# Patient Record
Sex: MALE | URBAN - METROPOLITAN AREA
[De-identification: names, ages, dates, MRNs, and addresses within clinical notes are randomized per-mention and may not be internally consistent; named-entity substitution may affect disease eponyms.]

---

## 2017-01-31 ENCOUNTER — APPOINTMENT (RX ONLY)
Dept: URBAN - METROPOLITAN AREA CLINIC 116 | Facility: CLINIC | Age: 82
Setting detail: DERMATOLOGY
End: 2017-01-31

## 2017-01-31 DIAGNOSIS — I87.2 VENOUS INSUFFICIENCY (CHRONIC) (PERIPHERAL): ICD-10-CM

## 2017-01-31 DIAGNOSIS — D18.0 HEMANGIOMA: ICD-10-CM

## 2017-01-31 DIAGNOSIS — L82.1 OTHER SEBORRHEIC KERATOSIS: ICD-10-CM

## 2017-01-31 DIAGNOSIS — L21.8 OTHER SEBORRHEIC DERMATITIS: ICD-10-CM

## 2017-01-31 DIAGNOSIS — D22 MELANOCYTIC NEVI: ICD-10-CM

## 2017-01-31 PROBLEM — I83.11 VARICOSE VEINS OF RIGHT LOWER EXTREMITY WITH INFLAMMATION: Status: ACTIVE | Noted: 2017-01-31

## 2017-01-31 PROBLEM — I83.12 VARICOSE VEINS OF LEFT LOWER EXTREMITY WITH INFLAMMATION: Status: ACTIVE | Noted: 2017-01-31

## 2017-01-31 PROBLEM — Z92.3 PERSONAL HISTORY OF IRRADIATION: Status: ACTIVE | Noted: 2017-01-31

## 2017-01-31 PROBLEM — D18.01 HEMANGIOMA OF SKIN AND SUBCUTANEOUS TISSUE: Status: ACTIVE | Noted: 2017-01-31

## 2017-01-31 PROBLEM — D22.4 MELANOCYTIC NEVI OF SCALP AND NECK: Status: ACTIVE | Noted: 2017-01-31

## 2017-01-31 PROBLEM — Z85.46 PERSONAL HISTORY OF MALIGNANT NEOPLASM OF PROSTATE: Status: ACTIVE | Noted: 2017-01-31

## 2017-01-31 PROCEDURE — ? COUNSELING

## 2017-01-31 PROCEDURE — 99214 OFFICE O/P EST MOD 30 MIN: CPT

## 2017-01-31 ASSESSMENT — LOCATION DETAILED DESCRIPTION DERM
LOCATION DETAILED: RIGHT MEDIAL INFERIOR CHEST
LOCATION DETAILED: RIGHT CLAVICULAR NECK
LOCATION DETAILED: EPIGASTRIC SKIN
LOCATION DETAILED: LEFT MEDIAL UPPER BACK
LOCATION DETAILED: LEFT MID-UPPER BACK
LOCATION DETAILED: PERIUMBILICAL SKIN
LOCATION DETAILED: LEFT PROXIMAL PRETIBIAL REGION
LOCATION DETAILED: NASAL DORSUM
LOCATION DETAILED: RIGHT DISTAL PRETIBIAL REGION
LOCATION DETAILED: LEFT SUPERIOR MEDIAL MIDBACK

## 2017-01-31 ASSESSMENT — SEVERITY ASSESSMENT
HOW SEVERE IS THIS PATIENT'S CONDITION?: MILD
SEVERITY: MILD

## 2017-01-31 ASSESSMENT — LOCATION SIMPLE DESCRIPTION DERM
LOCATION SIMPLE: LEFT PRETIBIAL REGION
LOCATION SIMPLE: RIGHT ANTERIOR NECK
LOCATION SIMPLE: LEFT UPPER BACK
LOCATION SIMPLE: RIGHT PRETIBIAL REGION
LOCATION SIMPLE: ABDOMEN
LOCATION SIMPLE: LEFT LOWER BACK
LOCATION SIMPLE: CHEST
LOCATION SIMPLE: NOSE

## 2017-01-31 ASSESSMENT — LOCATION ZONE DERM
LOCATION ZONE: TRUNK
LOCATION ZONE: NOSE
LOCATION ZONE: NECK
LOCATION ZONE: LEG

## 2018-01-05 ENCOUNTER — NEW PATIENT (OUTPATIENT)
Dept: URBAN - METROPOLITAN AREA CLINIC 26 | Facility: CLINIC | Age: 83
End: 2018-01-05

## 2018-01-05 VITALS
HEIGHT: 68 IN | BODY MASS INDEX: 26.52 KG/M2 | HEART RATE: 78 BPM | DIASTOLIC BLOOD PRESSURE: 71 MMHG | SYSTOLIC BLOOD PRESSURE: 125 MMHG | WEIGHT: 175 LBS

## 2018-01-05 DIAGNOSIS — H35.3132: ICD-10-CM

## 2018-01-05 DIAGNOSIS — H43.813: ICD-10-CM

## 2018-01-05 DIAGNOSIS — H26.491: ICD-10-CM

## 2018-01-05 PROCEDURE — 92134 CPTRZ OPH DX IMG PST SGM RTA: CPT

## 2018-01-05 PROCEDURE — 92250 FUNDUS PHOTOGRAPHY W/I&R: CPT

## 2018-01-05 PROCEDURE — 99204 OFFICE O/P NEW MOD 45 MIN: CPT

## 2018-01-05 ASSESSMENT — VISUAL ACUITY
OD_CC: 20/40-2
OS_CC: 20/25+2
OD_PH: 20/30-2

## 2018-01-05 ASSESSMENT — TONOMETRY
OD_IOP_MMHG: 15
OS_IOP_MMHG: 19

## 2018-02-20 ENCOUNTER — APPOINTMENT (RX ONLY)
Dept: URBAN - METROPOLITAN AREA CLINIC 116 | Facility: CLINIC | Age: 83
Setting detail: DERMATOLOGY
End: 2018-02-20

## 2018-02-20 DIAGNOSIS — D485 NEOPLASM OF UNCERTAIN BEHAVIOR OF SKIN: ICD-10-CM

## 2018-02-20 DIAGNOSIS — L57.0 ACTINIC KERATOSIS: ICD-10-CM

## 2018-02-20 DIAGNOSIS — D69.2 OTHER NONTHROMBOCYTOPENIC PURPURA: ICD-10-CM

## 2018-02-20 DIAGNOSIS — L82.1 OTHER SEBORRHEIC KERATOSIS: ICD-10-CM

## 2018-02-20 DIAGNOSIS — Z71.89 OTHER SPECIFIED COUNSELING: ICD-10-CM

## 2018-02-20 DIAGNOSIS — D18.0 HEMANGIOMA: ICD-10-CM

## 2018-02-20 DIAGNOSIS — D22 MELANOCYTIC NEVI: ICD-10-CM

## 2018-02-20 PROBLEM — D22.4 MELANOCYTIC NEVI OF SCALP AND NECK: Status: ACTIVE | Noted: 2018-02-20

## 2018-02-20 PROBLEM — D18.01 HEMANGIOMA OF SKIN AND SUBCUTANEOUS TISSUE: Status: ACTIVE | Noted: 2018-02-20

## 2018-02-20 PROBLEM — D48.5 NEOPLASM OF UNCERTAIN BEHAVIOR OF SKIN: Status: ACTIVE | Noted: 2018-02-20

## 2018-02-20 PROCEDURE — 99214 OFFICE O/P EST MOD 30 MIN: CPT | Mod: 25

## 2018-02-20 PROCEDURE — ? LIQUID NITROGEN

## 2018-02-20 PROCEDURE — ? COUNSELING

## 2018-02-20 PROCEDURE — 17000 DESTRUCT PREMALG LESION: CPT

## 2018-02-20 PROCEDURE — ? BIOPSY BY SHAVE METHOD

## 2018-02-20 PROCEDURE — 69100 BIOPSY OF EXTERNAL EAR: CPT | Mod: 59

## 2018-02-20 ASSESSMENT — LOCATION ZONE DERM
LOCATION ZONE: TRUNK
LOCATION ZONE: NOSE
LOCATION ZONE: EAR
LOCATION ZONE: ARM
LOCATION ZONE: NECK

## 2018-02-20 ASSESSMENT — LOCATION SIMPLE DESCRIPTION DERM
LOCATION SIMPLE: LEFT UPPER BACK
LOCATION SIMPLE: LEFT LOWER BACK
LOCATION SIMPLE: RIGHT EAR
LOCATION SIMPLE: ABDOMEN
LOCATION SIMPLE: CHEST
LOCATION SIMPLE: NOSE
LOCATION SIMPLE: RIGHT ANTERIOR NECK
LOCATION SIMPLE: RIGHT FOREARM

## 2018-02-20 ASSESSMENT — LOCATION DETAILED DESCRIPTION DERM
LOCATION DETAILED: RIGHT CLAVICULAR NECK
LOCATION DETAILED: PERIUMBILICAL SKIN
LOCATION DETAILED: RIGHT ANTITRAGUS
LOCATION DETAILED: RIGHT DISTAL DORSAL FOREARM
LOCATION DETAILED: NASAL DORSUM
LOCATION DETAILED: RIGHT MEDIAL SUPERIOR CHEST
LOCATION DETAILED: LEFT MEDIAL UPPER BACK
LOCATION DETAILED: LEFT MID-UPPER BACK
LOCATION DETAILED: LEFT SUPERIOR MEDIAL MIDBACK

## 2018-02-20 NOTE — PROCEDURE: BIOPSY BY SHAVE METHOD
Bill [id number] For Specimen Handling/Conveyance To Laboratory?: no
Detail Level: Detailed
Hemostasis: Aluminum Chloride
Billing Type: [employer]
Electrodesiccation And Curettage Text: The wound bed was treated with electrodesiccation and curettage after the biopsy was performed.
Biopsy Type: H and E
Silver Nitrate Text: The wound bed was treated with silver nitrate after the biopsy was performed.
Lab Facility: [facility]
Biopsy Method: Personna blade
Wound Care: Polysporin ointment
Anesthesia Volume In Cc (Will Not Render If 0): 0.5
Notification Instructions: Patient will be notified of biopsy results. However, patient instructed to call the office if not contacted within 2 weeks.
Electrodesiccation Text: The wound bed was treated with electrodesiccation after the biopsy was performed.
Cryotherapy Text: The wound bed was treated with cryotherapy after the biopsy was performed.
Additional Anesthesia Volume In Cc (Will Not Render If 0): 0
Post-Care Instructions: I reviewed with the patient in detail post-care instructions. Patient is to keep the biopsy site dry overnight, and then apply bacitracin twice daily until healed. Patient may apply hydrogen peroxide soaks to remove any crusting.
Lab: [facility]
Render Post-Care Instructions In Note?: yes
Type Of Destruction Used: Curettage
Anesthesia Type: 1% lidocaine with epinephrine and a 1:10 solution of 8.4% sodium bicarbonate
Consent: Written consent was obtained and risks were reviewed including but not limited to scarring, infection, bleeding, scabbing, incomplete removal, nerve damage and allergy to anesthesia.
Dressing: pressure dressing
Size Of Lesion In Cm: 0.8

## 2018-02-20 NOTE — PROCEDURE: LIQUID NITROGEN
Render Post-Care Instructions In Note?: no
Post-Care Instructions: I reviewed with the patient in detail post-care instructions. Patient is to wear sunprotection, and avoid picking at any of the treated lesions. Pt may apply Vaseline to crusted or scabbing areas.
Number Of Freeze-Thaw Cycles: 2 freeze-thaw cycles
Consent: The patient's consent was obtained including but not limited to risks of crusting, scabbing, blistering, scarring, darker or lighter pigmentary change, recurrence, incomplete removal and infection.
Duration Of Freeze Thaw-Cycle (Seconds): 3
Detail Level: Detailed

## 2018-03-02 ENCOUNTER — UNSCHEDULED FOLLOW UP (OUTPATIENT)
Dept: URBAN - METROPOLITAN AREA CLINIC 26 | Facility: CLINIC | Age: 83
End: 2018-03-02

## 2018-03-02 DIAGNOSIS — H35.3132: ICD-10-CM

## 2018-03-02 DIAGNOSIS — H43.813: ICD-10-CM

## 2018-03-02 PROCEDURE — 92014 COMPRE OPH EXAM EST PT 1/>: CPT

## 2018-03-02 PROCEDURE — 92250 FUNDUS PHOTOGRAPHY W/I&R: CPT

## 2018-03-02 ASSESSMENT — TONOMETRY
OD_IOP_MMHG: 11
OS_IOP_MMHG: 15

## 2018-03-02 ASSESSMENT — VISUAL ACUITY
OD_CC: 20/30-2
OS_CC: 20/25+2

## 2018-03-16 ENCOUNTER — FOLLOW UP (OUTPATIENT)
Dept: URBAN - METROPOLITAN AREA CLINIC 26 | Facility: CLINIC | Age: 83
End: 2018-03-16

## 2018-03-16 DIAGNOSIS — H35.3112: ICD-10-CM

## 2018-03-16 DIAGNOSIS — H26.491: ICD-10-CM

## 2018-03-16 DIAGNOSIS — H43.813: ICD-10-CM

## 2018-03-16 DIAGNOSIS — H35.3211: ICD-10-CM

## 2018-03-16 PROCEDURE — 67028 INJECTION EYE DRUG: CPT

## 2018-03-16 PROCEDURE — 92012 INTRM OPH EXAM EST PATIENT: CPT

## 2018-03-16 PROCEDURE — 92134 CPTRZ OPH DX IMG PST SGM RTA: CPT

## 2018-03-16 ASSESSMENT — VISUAL ACUITY
OD_CC: 20/25
OS_CC: 20/20

## 2018-03-16 ASSESSMENT — TONOMETRY
OS_IOP_MMHG: 15
OD_IOP_MMHG: 13

## 2018-03-20 ENCOUNTER — APPOINTMENT (RX ONLY)
Dept: URBAN - METROPOLITAN AREA CLINIC 116 | Facility: CLINIC | Age: 83
Setting detail: DERMATOLOGY
End: 2018-03-20

## 2018-03-20 DIAGNOSIS — L57.0 ACTINIC KERATOSIS: ICD-10-CM

## 2018-03-20 DIAGNOSIS — L21.8 OTHER SEBORRHEIC DERMATITIS: ICD-10-CM

## 2018-03-20 PROCEDURE — ? LIQUID NITROGEN

## 2018-03-20 PROCEDURE — 99214 OFFICE O/P EST MOD 30 MIN: CPT | Mod: 25

## 2018-03-20 PROCEDURE — ? ADDITIONAL NOTES

## 2018-03-20 PROCEDURE — 17000 DESTRUCT PREMALG LESION: CPT

## 2018-03-20 PROCEDURE — ? COUNSELING

## 2018-03-20 ASSESSMENT — LOCATION DETAILED DESCRIPTION DERM
LOCATION DETAILED: LEFT CAVUM CONCHA
LOCATION DETAILED: RIGHT ANTITRAGUS

## 2018-03-20 ASSESSMENT — LOCATION SIMPLE DESCRIPTION DERM
LOCATION SIMPLE: LEFT EAR
LOCATION SIMPLE: RIGHT EAR

## 2018-03-20 ASSESSMENT — LOCATION ZONE DERM: LOCATION ZONE: EAR

## 2018-03-20 NOTE — PROCEDURE: LIQUID NITROGEN
Render Post-Care Instructions In Note?: no
Detail Level: Detailed
Post-Care Instructions: I reviewed with the patient in detail post-care instructions. Patient is to wear sunprotection, and avoid picking at any of the treated lesions. Pt may apply Vaseline to crusted or scabbing areas.
Consent: The patient's consent was obtained including but not limited to risks of crusting, scabbing, blistering, scarring, darker or lighter pigmentary change, recurrence, incomplete removal and infection.
Number Of Freeze-Thaw Cycles: 2 freeze-thaw cycles
Duration Of Freeze Thaw-Cycle (Seconds): 3

## 2018-04-20 ENCOUNTER — FOLLOW UP (OUTPATIENT)
Dept: URBAN - METROPOLITAN AREA CLINIC 26 | Facility: CLINIC | Age: 83
End: 2018-04-20

## 2018-04-20 DIAGNOSIS — H43.813: ICD-10-CM

## 2018-04-20 DIAGNOSIS — H35.3211: ICD-10-CM

## 2018-04-20 DIAGNOSIS — H04.123: ICD-10-CM

## 2018-04-20 DIAGNOSIS — H02.836: ICD-10-CM

## 2018-04-20 DIAGNOSIS — H26.491: ICD-10-CM

## 2018-04-20 DIAGNOSIS — H02.833: ICD-10-CM

## 2018-04-20 DIAGNOSIS — H35.3112: ICD-10-CM

## 2018-04-20 PROCEDURE — 67028 INJECTION EYE DRUG: CPT

## 2018-04-20 PROCEDURE — 92134 CPTRZ OPH DX IMG PST SGM RTA: CPT

## 2018-04-20 PROCEDURE — 92250 FUNDUS PHOTOGRAPHY W/I&R: CPT

## 2018-04-20 PROCEDURE — 92012 INTRM OPH EXAM EST PATIENT: CPT

## 2018-04-20 ASSESSMENT — VISUAL ACUITY
OS_CC: 20/25-1
OD_CC: 20/30-2

## 2018-04-20 ASSESSMENT — TONOMETRY
OS_IOP_MMHG: 15
OD_IOP_MMHG: 13

## 2018-10-26 ENCOUNTER — FOLLOW UP AND POST INJECTION EVALUATION (OUTPATIENT)
Dept: URBAN - METROPOLITAN AREA CLINIC 26 | Facility: CLINIC | Age: 83
End: 2018-10-26

## 2018-10-26 DIAGNOSIS — H35.3112: ICD-10-CM

## 2018-10-26 DIAGNOSIS — H43.813: ICD-10-CM

## 2018-10-26 DIAGNOSIS — H35.3211: ICD-10-CM

## 2018-10-26 PROCEDURE — 92134 CPTRZ OPH DX IMG PST SGM RTA: CPT

## 2018-10-26 PROCEDURE — 67028 INJECTION EYE DRUG: CPT

## 2018-10-26 PROCEDURE — 92250 FUNDUS PHOTOGRAPHY W/I&R: CPT

## 2018-10-26 PROCEDURE — 92014 COMPRE OPH EXAM EST PT 1/>: CPT

## 2018-10-26 ASSESSMENT — TONOMETRY
OD_IOP_MMHG: 14
OS_IOP_MMHG: 15

## 2018-10-26 ASSESSMENT — VISUAL ACUITY
OD_CC: 20/50+2
OS_CC: 20/20

## 2018-12-07 ENCOUNTER — FOLLOW UP AND POST INJECTION EVALUATION (OUTPATIENT)
Dept: URBAN - METROPOLITAN AREA CLINIC 26 | Facility: CLINIC | Age: 83
End: 2018-12-07

## 2018-12-07 DIAGNOSIS — H43.813: ICD-10-CM

## 2018-12-07 DIAGNOSIS — H35.3211: ICD-10-CM

## 2018-12-07 DIAGNOSIS — H35.3112: ICD-10-CM

## 2018-12-07 PROCEDURE — 92250 FUNDUS PHOTOGRAPHY W/I&R: CPT

## 2018-12-07 PROCEDURE — 92012 INTRM OPH EXAM EST PATIENT: CPT

## 2018-12-07 PROCEDURE — 67028 INJECTION EYE DRUG: CPT

## 2018-12-07 PROCEDURE — 92134 CPTRZ OPH DX IMG PST SGM RTA: CPT

## 2018-12-07 ASSESSMENT — VISUAL ACUITY
OS_CC: 20/20
OD_CC: 20/50+2
OD_PH: 20/40

## 2018-12-07 ASSESSMENT — TONOMETRY
OD_IOP_MMHG: 11
OS_IOP_MMHG: 11

## 2019-01-10 ENCOUNTER — APPOINTMENT (RX ONLY)
Dept: URBAN - METROPOLITAN AREA CLINIC 116 | Facility: CLINIC | Age: 84
Setting detail: DERMATOLOGY
End: 2019-01-10

## 2019-01-10 DIAGNOSIS — D22 MELANOCYTIC NEVI: ICD-10-CM

## 2019-01-10 DIAGNOSIS — L57.0 ACTINIC KERATOSIS: ICD-10-CM

## 2019-01-10 DIAGNOSIS — L82.1 OTHER SEBORRHEIC KERATOSIS: ICD-10-CM

## 2019-01-10 DIAGNOSIS — L82.0 INFLAMED SEBORRHEIC KERATOSIS: ICD-10-CM

## 2019-01-10 DIAGNOSIS — Z71.89 OTHER SPECIFIED COUNSELING: ICD-10-CM

## 2019-01-10 DIAGNOSIS — D69.2 OTHER NONTHROMBOCYTOPENIC PURPURA: ICD-10-CM

## 2019-01-10 DIAGNOSIS — D18.0 HEMANGIOMA: ICD-10-CM

## 2019-01-10 PROBLEM — D22.4 MELANOCYTIC NEVI OF SCALP AND NECK: Status: ACTIVE | Noted: 2019-01-10

## 2019-01-10 PROBLEM — D18.01 HEMANGIOMA OF SKIN AND SUBCUTANEOUS TISSUE: Status: ACTIVE | Noted: 2019-01-10

## 2019-01-10 PROCEDURE — 99214 OFFICE O/P EST MOD 30 MIN: CPT | Mod: 25

## 2019-01-10 PROCEDURE — 17000 DESTRUCT PREMALG LESION: CPT | Mod: 59

## 2019-01-10 PROCEDURE — ? LIQUID NITROGEN

## 2019-01-10 PROCEDURE — ? COUNSELING

## 2019-01-10 PROCEDURE — 17110 DESTRUCTION B9 LES UP TO 14: CPT

## 2019-01-10 ASSESSMENT — LOCATION DETAILED DESCRIPTION DERM
LOCATION DETAILED: LEFT MEDIAL UPPER BACK
LOCATION DETAILED: LEFT SUPERIOR MEDIAL MIDBACK
LOCATION DETAILED: RIGHT DISTAL DORSAL FOREARM
LOCATION DETAILED: RIGHT CLAVICULAR SKIN
LOCATION DETAILED: PERIUMBILICAL SKIN
LOCATION DETAILED: LEFT MID-UPPER BACK
LOCATION DETAILED: RIGHT ANTERIOR SHOULDER
LOCATION DETAILED: RIGHT CLAVICULAR NECK
LOCATION DETAILED: NASAL DORSUM

## 2019-01-10 ASSESSMENT — LOCATION ZONE DERM
LOCATION ZONE: NECK
LOCATION ZONE: TRUNK
LOCATION ZONE: NOSE
LOCATION ZONE: ARM

## 2019-01-10 ASSESSMENT — LOCATION SIMPLE DESCRIPTION DERM
LOCATION SIMPLE: LEFT LOWER BACK
LOCATION SIMPLE: RIGHT ANTERIOR NECK
LOCATION SIMPLE: RIGHT CLAVICULAR SKIN
LOCATION SIMPLE: RIGHT SHOULDER
LOCATION SIMPLE: RIGHT FOREARM
LOCATION SIMPLE: LEFT UPPER BACK
LOCATION SIMPLE: NOSE
LOCATION SIMPLE: ABDOMEN

## 2019-01-10 NOTE — PROCEDURE: LIQUID NITROGEN
Number Of Freeze-Thaw Cycles: 2 freeze-thaw cycles
Render Post-Care Instructions In Note?: no
Consent: The patient's consent was obtained including but not limited to risks of crusting, scabbing, blistering, scarring, darker or lighter pigmentary change, recurrence, incomplete removal and infection.
Detail Level: Detailed
Post-Care Instructions: I reviewed with the patient in detail post-care instructions. Patient is to wear sunprotection, and avoid picking at any of the treated lesions. Pt may apply Vaseline to crusted or scabbing areas.
Duration Of Freeze Thaw-Cycle (Seconds): 3
Medical Necessity Information: It is in your best interest to select a reason for this procedure from the list below. All of these items fulfill various CMS LCD requirements except the new and changing color options.
Render Post-Care Instructions In Note?: yes
Medical Necessity Clause: This procedure was medically necessary because the lesions that were treated were:

## 2019-01-25 ENCOUNTER — CLINICAL PROCEDURE AND DIAGNOSTIC TESTING ONLY (OUTPATIENT)
Dept: URBAN - METROPOLITAN AREA CLINIC 26 | Facility: CLINIC | Age: 84
End: 2019-01-25

## 2019-01-25 DIAGNOSIS — H35.3211: ICD-10-CM

## 2019-01-25 PROCEDURE — 67028 INJECTION EYE DRUG: CPT

## 2019-01-25 PROCEDURE — 92134 CPTRZ OPH DX IMG PST SGM RTA: CPT

## 2019-01-25 ASSESSMENT — TONOMETRY
OS_IOP_MMHG: 16
OD_IOP_MMHG: 17

## 2019-01-25 ASSESSMENT — VISUAL ACUITY
OS_CC: 20/20-2
OD_CC: 20/30-2

## 2019-03-14 ENCOUNTER — CLINICAL PROCEDURE AND DIAGNOSTIC TESTING ONLY (OUTPATIENT)
Dept: URBAN - METROPOLITAN AREA CLINIC 26 | Facility: CLINIC | Age: 84
End: 2019-03-14

## 2019-03-14 DIAGNOSIS — H35.3211: ICD-10-CM

## 2019-03-14 PROCEDURE — 67028 INJECTION EYE DRUG: CPT

## 2019-03-14 PROCEDURE — 92134 CPTRZ OPH DX IMG PST SGM RTA: CPT

## 2019-03-14 ASSESSMENT — TONOMETRY
OD_IOP_MMHG: 14
OS_IOP_MMHG: 13

## 2019-03-14 ASSESSMENT — VISUAL ACUITY
OS_CC: 20/25+1
OD_CC: 20/30-2

## 2019-10-29 ENCOUNTER — APPOINTMENT (RX ONLY)
Dept: URBAN - METROPOLITAN AREA CLINIC 116 | Facility: CLINIC | Age: 84
Setting detail: DERMATOLOGY
End: 2019-10-29

## 2019-10-29 DIAGNOSIS — L57.0 ACTINIC KERATOSIS: ICD-10-CM

## 2019-10-29 DIAGNOSIS — D69.2 OTHER NONTHROMBOCYTOPENIC PURPURA: ICD-10-CM

## 2019-10-29 DIAGNOSIS — D22 MELANOCYTIC NEVI: ICD-10-CM

## 2019-10-29 DIAGNOSIS — L30.4 ERYTHEMA INTERTRIGO: ICD-10-CM

## 2019-10-29 DIAGNOSIS — Z71.89 OTHER SPECIFIED COUNSELING: ICD-10-CM

## 2019-10-29 DIAGNOSIS — D18.0 HEMANGIOMA: ICD-10-CM

## 2019-10-29 DIAGNOSIS — L82.1 OTHER SEBORRHEIC KERATOSIS: ICD-10-CM

## 2019-10-29 PROBLEM — D18.01 HEMANGIOMA OF SKIN AND SUBCUTANEOUS TISSUE: Status: ACTIVE | Noted: 2019-10-29

## 2019-10-29 PROBLEM — D22.4 MELANOCYTIC NEVI OF SCALP AND NECK: Status: ACTIVE | Noted: 2019-10-29

## 2019-10-29 PROCEDURE — ? LIQUID NITROGEN

## 2019-10-29 PROCEDURE — ? TREATMENT REGIMEN

## 2019-10-29 PROCEDURE — 17000 DESTRUCT PREMALG LESION: CPT

## 2019-10-29 PROCEDURE — 99214 OFFICE O/P EST MOD 30 MIN: CPT | Mod: 25

## 2019-10-29 PROCEDURE — ? COUNSELING

## 2019-10-29 ASSESSMENT — LOCATION DETAILED DESCRIPTION DERM
LOCATION DETAILED: LEFT SUPERIOR MEDIAL MIDBACK
LOCATION DETAILED: RIGHT MEDIAL BUTTOCK
LOCATION DETAILED: LEFT MEDIAL UPPER BACK
LOCATION DETAILED: RIGHT DISTAL DORSAL FOREARM
LOCATION DETAILED: NASAL DORSUM
LOCATION DETAILED: PERIUMBILICAL SKIN
LOCATION DETAILED: LEFT MEDIAL SUPERIOR CHEST
LOCATION DETAILED: LEFT MID-UPPER BACK
LOCATION DETAILED: RIGHT CLAVICULAR NECK

## 2019-10-29 ASSESSMENT — LOCATION SIMPLE DESCRIPTION DERM
LOCATION SIMPLE: RIGHT FOREARM
LOCATION SIMPLE: CHEST
LOCATION SIMPLE: LEFT LOWER BACK
LOCATION SIMPLE: ABDOMEN
LOCATION SIMPLE: RIGHT ANTERIOR NECK
LOCATION SIMPLE: RIGHT BUTTOCK
LOCATION SIMPLE: LEFT UPPER BACK
LOCATION SIMPLE: NOSE

## 2019-10-29 ASSESSMENT — LOCATION ZONE DERM
LOCATION ZONE: NECK
LOCATION ZONE: TRUNK
LOCATION ZONE: NOSE
LOCATION ZONE: ARM

## 2019-10-29 NOTE — PROCEDURE: LIQUID NITROGEN
Post-Care Instructions: I reviewed with the patient in detail post-care instructions. Patient is to wear sunprotection, and avoid picking at any of the treated lesions. Pt may apply Vaseline to crusted or scabbing areas.
Detail Level: Detailed
Duration Of Freeze Thaw-Cycle (Seconds): 3
Render Note In Bullet Format When Appropriate: No
Render Post-Care Instructions In Note?: yes
Consent: The patient's consent was obtained including but not limited to risks of crusting, scabbing, blistering, scarring, darker or lighter pigmentary change, recurrence, incomplete removal and infection.
Number Of Freeze-Thaw Cycles: 2 freeze-thaw cycles

## 2019-10-29 NOTE — PROCEDURE: MIPS QUALITY
Quality 131: Pain Assessment And Follow-Up: Pain assessment using a standardized tool is documented as negative, no follow-up plan required
Quality 130: Documentation Of Current Medications In The Medical Record: Current Medications Documented
Additional Notes: 0/10 pain
Detail Level: Simple

## 2019-11-19 ENCOUNTER — FOLLOW UP AND POST INJECTION EVALUATION (OUTPATIENT)
Dept: URBAN - METROPOLITAN AREA CLINIC 26 | Facility: CLINIC | Age: 84
End: 2019-11-19

## 2019-11-19 DIAGNOSIS — H35.3112: ICD-10-CM

## 2019-11-19 DIAGNOSIS — H35.3211: ICD-10-CM

## 2019-11-19 DIAGNOSIS — H43.813: ICD-10-CM

## 2019-11-19 PROCEDURE — 92014 COMPRE OPH EXAM EST PT 1/>: CPT

## 2019-11-19 PROCEDURE — 67028 INJECTION EYE DRUG: CPT

## 2019-11-19 PROCEDURE — 92250 FUNDUS PHOTOGRAPHY W/I&R: CPT

## 2019-11-19 ASSESSMENT — TONOMETRY
OD_IOP_MMHG: 14
OS_IOP_MMHG: 13

## 2019-11-19 ASSESSMENT — VISUAL ACUITY
OS_CC: 20/30+2
OD_CC: 20/50+2

## 2020-01-14 ENCOUNTER — CLINICAL PROCEDURE AND DIAGNOSTIC TESTING ONLY (OUTPATIENT)
Dept: URBAN - METROPOLITAN AREA CLINIC 26 | Facility: CLINIC | Age: 85
End: 2020-01-14

## 2020-01-14 DIAGNOSIS — H35.3211: ICD-10-CM

## 2020-01-14 DIAGNOSIS — H35.3122: ICD-10-CM

## 2020-01-14 PROCEDURE — 92250 FUNDUS PHOTOGRAPHY W/I&R: CPT

## 2020-01-14 PROCEDURE — 92134 CPTRZ OPH DX IMG PST SGM RTA: CPT

## 2020-01-14 PROCEDURE — 67028 INJECTION EYE DRUG: CPT

## 2020-01-14 ASSESSMENT — VISUAL ACUITY
OD_CC: 20/40-2
OS_CC: 20/30-2

## 2020-01-14 ASSESSMENT — TONOMETRY
OD_IOP_MMHG: 16
OS_IOP_MMHG: 17

## 2020-02-25 ENCOUNTER — APPOINTMENT (RX ONLY)
Dept: URBAN - METROPOLITAN AREA CLINIC 116 | Facility: CLINIC | Age: 85
Setting detail: DERMATOLOGY
End: 2020-02-25

## 2020-02-25 DIAGNOSIS — L30.4 ERYTHEMA INTERTRIGO: ICD-10-CM

## 2020-02-25 DIAGNOSIS — Z71.89 OTHER SPECIFIED COUNSELING: ICD-10-CM

## 2020-02-25 PROCEDURE — 99213 OFFICE O/P EST LOW 20 MIN: CPT

## 2020-02-25 PROCEDURE — ? COUNSELING

## 2020-02-25 PROCEDURE — ? PRESCRIPTION

## 2020-02-25 PROCEDURE — ? TREATMENT REGIMEN

## 2020-02-25 RX ORDER — IODOQUINOL, HYDROCORTISONE ACETATE AND ALOE VERA LEAF 10; 20; 10 MG/G; MG/G; MG/G
1 GEL TOPICAL BID
Qty: 1 | Refills: 3 | Status: ERX | COMMUNITY
Start: 2020-02-25

## 2020-02-25 RX ADMIN — IODOQUINOL, HYDROCORTISONE ACETATE AND ALOE VERA LEAF 1: 10; 20; 10 GEL TOPICAL at 00:00

## 2020-02-25 ASSESSMENT — LOCATION ZONE DERM: LOCATION ZONE: TRUNK

## 2020-02-25 ASSESSMENT — LOCATION DETAILED DESCRIPTION DERM: LOCATION DETAILED: RIGHT MEDIAL BUTTOCK

## 2020-02-25 ASSESSMENT — LOCATION SIMPLE DESCRIPTION DERM: LOCATION SIMPLE: RIGHT BUTTOCK

## 2020-02-25 NOTE — PROCEDURE: TREATMENT REGIMEN
Detail Level: Simple
Discontinue Regimen: Mupirocin ointment
Plan: Apply Alcorin  gel twice daily on dry skin then apply Desitin

## 2020-03-06 ENCOUNTER — CLINICAL PROCEDURE AND DIAGNOSTIC TESTING ONLY (OUTPATIENT)
Dept: URBAN - METROPOLITAN AREA CLINIC 26 | Facility: CLINIC | Age: 85
End: 2020-03-06

## 2020-03-06 DIAGNOSIS — H35.3211: ICD-10-CM

## 2020-03-06 DIAGNOSIS — H35.3122: ICD-10-CM

## 2020-03-06 PROCEDURE — 67028 INJECTION EYE DRUG: CPT

## 2020-03-06 PROCEDURE — 92134 CPTRZ OPH DX IMG PST SGM RTA: CPT

## 2020-03-06 PROCEDURE — 92250 FUNDUS PHOTOGRAPHY W/I&R: CPT

## 2020-03-06 ASSESSMENT — TONOMETRY
OS_IOP_MMHG: 15
OD_IOP_MMHG: 18

## 2020-03-06 ASSESSMENT — VISUAL ACUITY
OD_CC: 20/40-2
OS_CC: 20/30

## 2020-05-01 ENCOUNTER — FOLLOW UP AND POST INJECTION EVALUATION (OUTPATIENT)
Dept: URBAN - METROPOLITAN AREA CLINIC 26 | Facility: CLINIC | Age: 85
End: 2020-05-01

## 2020-05-01 DIAGNOSIS — H43.813: ICD-10-CM

## 2020-05-01 DIAGNOSIS — H35.3211: ICD-10-CM

## 2020-05-01 DIAGNOSIS — H35.3122: ICD-10-CM

## 2020-05-01 PROCEDURE — 92250 FUNDUS PHOTOGRAPHY W/I&R: CPT

## 2020-05-01 PROCEDURE — 92134 CPTRZ OPH DX IMG PST SGM RTA: CPT

## 2020-05-01 PROCEDURE — 67028 INJECTION EYE DRUG: CPT

## 2020-05-01 PROCEDURE — 92012 INTRM OPH EXAM EST PATIENT: CPT

## 2020-05-01 ASSESSMENT — TONOMETRY
OD_IOP_MMHG: 12
OS_IOP_MMHG: 13

## 2020-05-01 ASSESSMENT — VISUAL ACUITY
OS_SC: 20/40-2
OD_SC: 20/30-2

## 2020-05-21 ENCOUNTER — UNSCHEDULED FOLLOW UP (OUTPATIENT)
Dept: URBAN - METROPOLITAN AREA CLINIC 26 | Facility: CLINIC | Age: 85
End: 2020-05-21

## 2020-05-21 VITALS — WEIGHT: 175 LBS | BODY MASS INDEX: 26.52 KG/M2 | HEIGHT: 68 IN

## 2020-05-21 DIAGNOSIS — H26.491: ICD-10-CM

## 2020-05-21 DIAGNOSIS — H43.813: ICD-10-CM

## 2020-05-21 DIAGNOSIS — H35.3122: ICD-10-CM

## 2020-05-21 DIAGNOSIS — H02.836: ICD-10-CM

## 2020-05-21 DIAGNOSIS — H35.3211: ICD-10-CM

## 2020-05-21 DIAGNOSIS — H02.833: ICD-10-CM

## 2020-05-21 DIAGNOSIS — H57.12: ICD-10-CM

## 2020-05-21 DIAGNOSIS — H04.123: ICD-10-CM

## 2020-05-21 PROCEDURE — 92014 COMPRE OPH EXAM EST PT 1/>: CPT

## 2020-05-21 PROCEDURE — 92134 CPTRZ OPH DX IMG PST SGM RTA: CPT

## 2020-05-21 ASSESSMENT — TONOMETRY
OD_IOP_MMHG: 18
OS_IOP_MMHG: 19

## 2020-05-21 ASSESSMENT — VISUAL ACUITY
OD_PH: 20/30
OD_CC: 20/50+2
OS_CC: 20/30-2

## 2021-01-26 ENCOUNTER — APPOINTMENT (RX ONLY)
Dept: URBAN - METROPOLITAN AREA CLINIC 116 | Facility: CLINIC | Age: 86
Setting detail: DERMATOLOGY
End: 2021-01-26

## 2021-01-26 DIAGNOSIS — D22 MELANOCYTIC NEVI: ICD-10-CM

## 2021-01-26 DIAGNOSIS — D18.0 HEMANGIOMA: ICD-10-CM

## 2021-01-26 DIAGNOSIS — Z71.89 OTHER SPECIFIED COUNSELING: ICD-10-CM

## 2021-01-26 DIAGNOSIS — L30.4 ERYTHEMA INTERTRIGO: ICD-10-CM

## 2021-01-26 DIAGNOSIS — L82.1 OTHER SEBORRHEIC KERATOSIS: ICD-10-CM

## 2021-01-26 PROBLEM — D22.4 MELANOCYTIC NEVI OF SCALP AND NECK: Status: ACTIVE | Noted: 2021-01-26

## 2021-01-26 PROBLEM — D18.01 HEMANGIOMA OF SKIN AND SUBCUTANEOUS TISSUE: Status: ACTIVE | Noted: 2021-01-26

## 2021-01-26 PROCEDURE — ? COUNSELING

## 2021-01-26 PROCEDURE — ? PRESCRIPTION

## 2021-01-26 PROCEDURE — 99214 OFFICE O/P EST MOD 30 MIN: CPT

## 2021-01-26 RX ORDER — CLOTRIMAZOLE AND BETAMETHASONE DIPROPIONATE 10; .5 MG/G; MG/G
1 CREAM TOPICAL QD
Qty: 1 | Refills: 2 | Status: ERX | COMMUNITY
Start: 2021-01-26

## 2021-01-26 RX ADMIN — CLOTRIMAZOLE AND BETAMETHASONE DIPROPIONATE 1: 10; .5 CREAM TOPICAL at 00:00

## 2021-01-26 ASSESSMENT — LOCATION DETAILED DESCRIPTION DERM
LOCATION DETAILED: PERIUMBILICAL SKIN
LOCATION DETAILED: LEFT SUPERIOR MEDIAL MIDBACK
LOCATION DETAILED: LEFT MID-UPPER BACK
LOCATION DETAILED: LEFT ANTERIOR PROXIMAL THIGH
LOCATION DETAILED: RIGHT MEDIAL BUTTOCK
LOCATION DETAILED: LEFT MEDIAL UPPER BACK
LOCATION DETAILED: NASAL DORSUM
LOCATION DETAILED: RIGHT CLAVICULAR NECK

## 2021-01-26 ASSESSMENT — LOCATION SIMPLE DESCRIPTION DERM
LOCATION SIMPLE: LEFT THIGH
LOCATION SIMPLE: ABDOMEN
LOCATION SIMPLE: NOSE
LOCATION SIMPLE: RIGHT BUTTOCK
LOCATION SIMPLE: LEFT LOWER BACK
LOCATION SIMPLE: LEFT UPPER BACK
LOCATION SIMPLE: RIGHT ANTERIOR NECK

## 2021-01-26 ASSESSMENT — LOCATION ZONE DERM
LOCATION ZONE: TRUNK
LOCATION ZONE: LEG
LOCATION ZONE: NOSE
LOCATION ZONE: NECK

## 2021-02-23 ENCOUNTER — FOLLOW UP (OUTPATIENT)
Dept: URBAN - METROPOLITAN AREA CLINIC 26 | Facility: CLINIC | Age: 86
End: 2021-02-23

## 2021-02-23 VITALS — HEIGHT: 68 IN | BODY MASS INDEX: 25.01 KG/M2 | WEIGHT: 165 LBS

## 2021-02-23 DIAGNOSIS — H43.813: ICD-10-CM

## 2021-02-23 DIAGNOSIS — H35.3211: ICD-10-CM

## 2021-02-23 DIAGNOSIS — H35.3122: ICD-10-CM

## 2021-02-23 PROCEDURE — 92134 CPTRZ OPH DX IMG PST SGM RTA: CPT

## 2021-02-23 PROCEDURE — 92014 COMPRE OPH EXAM EST PT 1/>: CPT

## 2021-02-23 PROCEDURE — 67028 INJECTION EYE DRUG: CPT

## 2021-02-23 PROCEDURE — 92250 FUNDUS PHOTOGRAPHY W/I&R: CPT

## 2021-02-23 ASSESSMENT — VISUAL ACUITY
OD_CC: 20/30-1
OS_CC: 20/20-1

## 2021-02-23 ASSESSMENT — TONOMETRY
OS_IOP_MMHG: 13
OD_IOP_MMHG: 16

## 2021-04-06 ENCOUNTER — APPOINTMENT (RX ONLY)
Dept: URBAN - METROPOLITAN AREA CLINIC 121 | Facility: CLINIC | Age: 86
Setting detail: DERMATOLOGY
End: 2021-04-06

## 2021-04-06 ENCOUNTER — CLINICAL PROCEDURE AND DIAGNOSTIC TESTING ONLY (OUTPATIENT)
Dept: URBAN - METROPOLITAN AREA CLINIC 26 | Facility: CLINIC | Age: 86
End: 2021-04-06

## 2021-04-06 DIAGNOSIS — H35.3122: ICD-10-CM

## 2021-04-06 DIAGNOSIS — Z85.828 PERSONAL HISTORY OF OTHER MALIGNANT NEOPLASM OF SKIN: ICD-10-CM

## 2021-04-06 DIAGNOSIS — L81.4 OTHER MELANIN HYPERPIGMENTATION: ICD-10-CM

## 2021-04-06 DIAGNOSIS — Z71.89 OTHER SPECIFIED COUNSELING: ICD-10-CM

## 2021-04-06 DIAGNOSIS — H35.3211: ICD-10-CM

## 2021-04-06 DIAGNOSIS — D485 NEOPLASM OF UNCERTAIN BEHAVIOR OF SKIN: ICD-10-CM

## 2021-04-06 PROBLEM — D48.5 NEOPLASM OF UNCERTAIN BEHAVIOR OF SKIN: Status: ACTIVE | Noted: 2021-04-06

## 2021-04-06 PROCEDURE — 11102 TANGNTL BX SKIN SINGLE LES: CPT

## 2021-04-06 PROCEDURE — 67028 INJECTION EYE DRUG: CPT

## 2021-04-06 PROCEDURE — 99212 OFFICE O/P EST SF 10 MIN: CPT | Mod: 25

## 2021-04-06 PROCEDURE — ? COUNSELING

## 2021-04-06 PROCEDURE — 92250 FUNDUS PHOTOGRAPHY W/I&R: CPT

## 2021-04-06 PROCEDURE — ? BIOPSY BY SHAVE METHOD

## 2021-04-06 PROCEDURE — 11103 TANGNTL BX SKIN EA SEP/ADDL: CPT

## 2021-04-06 ASSESSMENT — LOCATION ZONE DERM: LOCATION ZONE: LEG

## 2021-04-06 ASSESSMENT — LOCATION DETAILED DESCRIPTION DERM
LOCATION DETAILED: RIGHT DISTAL PRETIBIAL REGION
LOCATION DETAILED: LEFT POSTERIOR ANKLE

## 2021-04-06 ASSESSMENT — LOCATION SIMPLE DESCRIPTION DERM
LOCATION SIMPLE: LEFT ANKLE
LOCATION SIMPLE: RIGHT PRETIBIAL REGION

## 2021-04-06 NOTE — PROCEDURE: BIOPSY BY SHAVE METHOD
Detail Level: Detailed
Depth Of Biopsy: dermis
Was A Bandage Applied: Yes
Size Of Lesion In Cm: 1.6
X Size Of Lesion In Cm: 0
Biopsy Type: H and E
Biopsy Method: Personna blade
Anesthesia Type: 1% lidocaine with epinephrine and a 1:10 solution of 8.4% sodium bicarbonate
Anesthesia Volume In Cc (Will Not Render If 0): 1
Hemostasis: Aluminum Chloride and Electrocautery
Wound Care: Vaseline
Dressing: pressure dressing with telfa
Destruction After The Procedure: No
Type Of Destruction Used: Curettage
Cryotherapy Text: The wound bed was treated with cryotherapy after the biopsy was performed.
Electrodesiccation Text: The wound bed was treated with electrodesiccation after the biopsy was performed.
Electrodesiccation And Curettage Text: The wound bed was treated with electrodesiccation and curettage after the biopsy was performed.
Silver Nitrate Text: The wound bed was treated with silver nitrate after the biopsy was performed.
Lab: [facility]
Lab Facility: [facility]
Consent: Written consent was obtained and risks were reviewed including but not limited to scarring, infection, bleeding, scabbing, incomplete removal, nerve damage and allergy to anesthesia.
Post-Care Instructions: I reviewed with the patient in detail post-care instructions. Patient is to keep the biopsy site dry overnight, and then apply bacitracin twice daily until healed. Patient may apply hydrogen peroxide soaks to remove any crusting.
Notification Instructions: Patient will be notified of biopsy results. However, patient instructed to call the office if not contacted within 2 weeks.
Billing Type: [employer]
Information: Selecting Yes will display possible errors in your note based on the variables you have selected. This validation is only offered as a suggestion for you. PLEASE NOTE THAT THE VALIDATION TEXT WILL BE REMOVED WHEN YOU FINALIZE YOUR NOTE. IF YOU WANT TO FAX A PRELIMINARY NOTE YOU WILL NEED TO TOGGLE THIS TO 'NO' IF YOU DO NOT WANT IT IN YOUR FAXED NOTE.
Lab: [id number]
Lab Facility: [id number]
Billing Type: Third-Party Bill

## 2022-01-14 ENCOUNTER — FOLLOW UP (OUTPATIENT)
Dept: URBAN - METROPOLITAN AREA CLINIC 26 | Facility: CLINIC | Age: 87
End: 2022-01-14

## 2022-01-14 VITALS — HEIGHT: 68 IN | BODY MASS INDEX: 25.01 KG/M2 | WEIGHT: 165 LBS

## 2022-01-14 DIAGNOSIS — H35.3211: ICD-10-CM

## 2022-01-14 DIAGNOSIS — H43.813: ICD-10-CM

## 2022-01-14 DIAGNOSIS — H35.3221: ICD-10-CM

## 2022-01-14 DIAGNOSIS — H04.123: ICD-10-CM

## 2022-01-14 PROCEDURE — 67028 INJECTION EYE DRUG: CPT

## 2022-01-14 PROCEDURE — J277850 RANIBIZUMAB (LUCENTIS) BILATERAL

## 2022-01-14 PROCEDURE — 92014 COMPRE OPH EXAM EST PT 1/>: CPT

## 2022-01-14 PROCEDURE — 92250 FUNDUS PHOTOGRAPHY W/I&R: CPT

## 2022-01-14 PROCEDURE — 92134 CPTRZ OPH DX IMG PST SGM RTA: CPT

## 2022-01-14 ASSESSMENT — VISUAL ACUITY
OS_CC: 20/50+1
OD_CC: 20/30-1

## 2022-01-14 ASSESSMENT — TONOMETRY
OS_IOP_MMHG: 19
OD_IOP_MMHG: 20